# Patient Record
Sex: MALE | Race: OTHER | NOT HISPANIC OR LATINO | URBAN - METROPOLITAN AREA
[De-identification: names, ages, dates, MRNs, and addresses within clinical notes are randomized per-mention and may not be internally consistent; named-entity substitution may affect disease eponyms.]

---

## 2022-02-25 ENCOUNTER — EMERGENCY (EMERGENCY)
Facility: HOSPITAL | Age: 22
LOS: 1 days | Discharge: HOME | End: 2022-02-25
Attending: EMERGENCY MEDICINE
Payer: COMMERCIAL

## 2022-02-25 VITALS
TEMPERATURE: 98 F | DIASTOLIC BLOOD PRESSURE: 64 MMHG | WEIGHT: 173.06 LBS | OXYGEN SATURATION: 100 % | RESPIRATION RATE: 18 BRPM | SYSTOLIC BLOOD PRESSURE: 125 MMHG | HEART RATE: 74 BPM

## 2022-02-25 LAB
ALBUMIN SERPL ELPH-MCNC: 4.9 G/DL — SIGNIFICANT CHANGE UP (ref 3.3–5)
ALP SERPL-CCNC: 77 U/L — SIGNIFICANT CHANGE UP (ref 40–120)
ALT FLD-CCNC: 13 U/L — SIGNIFICANT CHANGE UP (ref 10–45)
ANION GAP SERPL CALC-SCNC: 10 MMOL/L — SIGNIFICANT CHANGE UP (ref 5–17)
AST SERPL-CCNC: 12 U/L — SIGNIFICANT CHANGE UP (ref 10–40)
BASOPHILS # BLD AUTO: 0.02 K/UL — SIGNIFICANT CHANGE UP (ref 0–0.2)
BASOPHILS NFR BLD AUTO: 0.2 % — SIGNIFICANT CHANGE UP (ref 0–2)
BILIRUB SERPL-MCNC: 0.4 MG/DL — SIGNIFICANT CHANGE UP (ref 0.2–1.2)
BUN SERPL-MCNC: 9 MG/DL — SIGNIFICANT CHANGE UP (ref 7–23)
CALCIUM SERPL-MCNC: 9.7 MG/DL — SIGNIFICANT CHANGE UP (ref 8.4–10.5)
CHLORIDE SERPL-SCNC: 103 MMOL/L — SIGNIFICANT CHANGE UP (ref 96–108)
CO2 SERPL-SCNC: 25 MMOL/L — SIGNIFICANT CHANGE UP (ref 22–31)
CREAT SERPL-MCNC: 0.87 MG/DL — SIGNIFICANT CHANGE UP (ref 0.5–1.3)
EOSINOPHIL # BLD AUTO: 0.01 K/UL — SIGNIFICANT CHANGE UP (ref 0–0.5)
EOSINOPHIL NFR BLD AUTO: 0.1 % — SIGNIFICANT CHANGE UP (ref 0–6)
GLUCOSE SERPL-MCNC: 101 MG/DL — HIGH (ref 70–99)
HCT VFR BLD CALC: 38.8 % — LOW (ref 39–50)
HGB BLD-MCNC: 12.6 G/DL — LOW (ref 13–17)
IMM GRANULOCYTES NFR BLD AUTO: 0.4 % — SIGNIFICANT CHANGE UP (ref 0–1.5)
LYMPHOCYTES # BLD AUTO: 1.26 K/UL — SIGNIFICANT CHANGE UP (ref 1–3.3)
LYMPHOCYTES # BLD AUTO: 12.2 % — LOW (ref 13–44)
MCHC RBC-ENTMCNC: 27.3 PG — SIGNIFICANT CHANGE UP (ref 27–34)
MCHC RBC-ENTMCNC: 32.5 GM/DL — SIGNIFICANT CHANGE UP (ref 32–36)
MCV RBC AUTO: 84.2 FL — SIGNIFICANT CHANGE UP (ref 80–100)
MONOCYTES # BLD AUTO: 0.43 K/UL — SIGNIFICANT CHANGE UP (ref 0–0.9)
MONOCYTES NFR BLD AUTO: 4.2 % — SIGNIFICANT CHANGE UP (ref 2–14)
NEUTROPHILS # BLD AUTO: 8.58 K/UL — HIGH (ref 1.8–7.4)
NEUTROPHILS NFR BLD AUTO: 82.9 % — HIGH (ref 43–77)
NRBC # BLD: 0 /100 WBCS — SIGNIFICANT CHANGE UP (ref 0–0)
PLATELET # BLD AUTO: 273 K/UL — SIGNIFICANT CHANGE UP (ref 150–400)
POTASSIUM SERPL-MCNC: 4.6 MMOL/L — SIGNIFICANT CHANGE UP (ref 3.5–5.3)
POTASSIUM SERPL-SCNC: 4.6 MMOL/L — SIGNIFICANT CHANGE UP (ref 3.5–5.3)
PROT SERPL-MCNC: 7.7 G/DL — SIGNIFICANT CHANGE UP (ref 6–8.3)
RBC # BLD: 4.61 M/UL — SIGNIFICANT CHANGE UP (ref 4.2–5.8)
RBC # FLD: 12.9 % — SIGNIFICANT CHANGE UP (ref 10.3–14.5)
SARS-COV-2 RNA SPEC QL NAA+PROBE: SIGNIFICANT CHANGE UP
SODIUM SERPL-SCNC: 138 MMOL/L — SIGNIFICANT CHANGE UP (ref 135–145)
WBC # BLD: 10.34 K/UL — SIGNIFICANT CHANGE UP (ref 3.8–10.5)
WBC # FLD AUTO: 10.34 K/UL — SIGNIFICANT CHANGE UP (ref 3.8–10.5)

## 2022-02-25 PROCEDURE — 99220: CPT

## 2022-02-25 RX ORDER — CEFAZOLIN SODIUM 1 G
1000 VIAL (EA) INJECTION ONCE
Refills: 0 | Status: COMPLETED | OUTPATIENT
Start: 2022-02-25 | End: 2022-02-25

## 2022-02-25 RX ORDER — KETOROLAC TROMETHAMINE 30 MG/ML
15 SYRINGE (ML) INJECTION EVERY 8 HOURS
Refills: 0 | Status: DISCONTINUED | OUTPATIENT
Start: 2022-02-25 | End: 2022-02-25

## 2022-02-25 RX ORDER — CEFAZOLIN SODIUM 1 G
1000 VIAL (EA) INJECTION EVERY 8 HOURS
Refills: 0 | Status: DISCONTINUED | OUTPATIENT
Start: 2022-02-25 | End: 2022-02-28

## 2022-02-25 RX ADMIN — Medication 100 MILLIGRAM(S): at 20:38

## 2022-02-25 RX ADMIN — Medication 100 MILLIGRAM(S): at 13:49

## 2022-02-25 RX ADMIN — Medication 100 MILLIGRAM(S): at 12:54

## 2022-02-25 NOTE — ED CDU PROVIDER INITIAL DAY NOTE - OBJECTIVE STATEMENT
22yM, no PMH, presenting with L. sided groin abscess. Reports it started a couple days ago as a pimple, now getting more painful/difficult to walk on L. leg due to pain. Started draining pus yesterday. No testicular pain/swelling, penile discharge. Not sexually active. No fevers, chills, chest pain, SOB, abd pain, n/v/d. no recent shaving at that area.

## 2022-02-25 NOTE — ED PROVIDER NOTE - ATTENDING CONTRIBUTION TO CARE
Attending MD Morrow: I personally have seen and examined this patient.  Resident note reviewed and agree on plan of care and except where noted.  See below for details.     seen in [facility], accompanied by father    22M with no reported PMH/PSH/Meds, no known drug allergies presents to the ED with L groin abscess.  Reports that first noted a few days ago an area of pain.  Reports yesterday noted spontaneous purulent drainage from same area with increased pain with ambulation.  Denies testicular pain, penile discharge, genital lesions, denies sexually active, history of STI.  Denies fevers, chills.  Denies chest pain, shortness of breath, palpitations. Denies abdominal pain, nausea, vomiting, diarrhea, bloody or black stools. Denies previous abscess, injury to area, denies shaving.  A ten (10) point review of systems was negative other than as stated in the HPI or elsewhere in the chart.     Exam:   General: NAD  HENT: head NCAT, airway patent with moist mucous membranes  Eyes: PERRL  Lungs: lungs CTAB with good inspiratory effort, no wheezing, no rhonchi, no rales  Cardiac: +S1S2, no m/r/g  GI: abdomen soft with +BS, NT, ND  : no CVAT, chaperoned by Dr. Ross, no genital lesions, no testicular tenderness, no scrotal edema, no blood or purulence at meatus  MSK: FROM at neck and extremities without gross deformities  Neuro: moving all extremities spontaneously, sensory grossly intact, no gross neuro deficits  Psych: normal mood and affect   Skin: 11cm x 3cm area of induration at crease of medial thigh (extension of inguinal crease posteriorly), 2cm x 1cm central area with spontaneous drainage of purulence, minimal amount expressed, no crepitus, no ecchymosis    A/P: 22M with L groin abscess, spontaneously draining no extension into perineum or scrotum, history and clinical picture not consistent with [facility] at this time, will obtain labs, give IV abx and place in CDU for obs, discussed with patient and father, amenable

## 2022-02-25 NOTE — ED ADULT NURSE NOTE - OBJECTIVE STATEMENT
Ambulatory, well-appearing patient in no acute distress complains of groin pain.  Pt reports 2 days of abscess formation to left groin, does not recall any cut or wound to area, no shaving, draining yellow/brown pus, presents today because of pain in left leg with ambulation.  Has not seen MD for this yet, no abx.  Pt is conversive, abdomen soft/non-distended/non-tender, denies abdominal pain, nausea, vomiting, diarrhea, decreased po intake, fevers, chills, urinary symptoms.  No hx of abscesses.  Has not tried anything for pain and declines pain medications here.

## 2022-02-25 NOTE — ED PROVIDER NOTE - CARE PLAN
1 Principal Discharge DX:	Cellulitis of skin   Principal Discharge DX:	Cellulitis and abscess  Goal:	Left, groin

## 2022-02-25 NOTE — ED CDU PROVIDER DISPOSITION NOTE - ATTENDING CONTRIBUTION TO CARE
Attending MD Morrow:   I personally have seen and examined this patient.  Physician assistant note reviewed and agree on plan of care and except where noted.  See below for details.     [facility]    22M with no reported PMH/PSH/Meds, no known drug allergies sent to the CDU after presenting to the ED with L groin abscess.  Reports first noted a few days ago, 2 days ago noted spontaneous purulent drainage with increased pain.  Reports pain improved from yesterday and feels area has improved.  Denies fevers, chills.  Denies chest pain, shortness of breath, abdominal pain, nausea, vomiting, diarrhea. Denies dysuria, hematuria, change in urinary habits or difficulty urinating.  A ten (10) point review of systems was negative other than as stated in the HPI or elsewhere in the chart.     Exam:   General: NAD  HENT: head NCAT, airway patent  Eyes: no conjunctival injection  Lungs: lungs CTAB with good inspiratory effort  Cardiac: +S1S2, no m/r/g  GI: abdomen soft with +BS, NT, ND  : chaperoned by IAN Gibbs, no scrotal induration, erythema or edema  MSK: FROM at neck and extremities without gross deformities  Neuro: moving all extremities spontaneously, sensory grossly intact, no gross neuro deficits  Psych: normal mood and affect   Skin: induration receded from demarcated area yesterday, today induration about 4cm x 2cm mostly anterior to the area of drainage, +spontaneously draining straw colored viscous purulence minimal amount able to be expressed, no crepitus     A/P: 22M with L groin abscess, spontaneously draining no extension into perineum or scrotum, history and clinical picture not consistent with [facility] at this time, improved from yesterday, will dc with Rx Keflex, Doxy.  Patient re-evaluated and feeling improved.  No acute issues at  this time.  Lab tests reviewed with patient.  Patient will stay for 12pm dose of Ancef.  If remains unchanged, stable for discharge after that dose.  Follow up instructions given, patient verbalized understanding of following up with PMD Dr. Pack on Monday 2/28/22, importance of follow up emphasized, return to ED parameters reviewed and patient verbalized understanding.  All questions answered, all concerns addressed.

## 2022-02-25 NOTE — ED PROVIDER NOTE - PROGRESS NOTE DETAILS
Area of induration marked with sterile marker. 11 cm x 3cm. Will be reassessed tmrw in CDU for improvement.

## 2022-02-25 NOTE — ED CDU PROVIDER DISPOSITION NOTE - PATIENT PORTAL LINK FT
You can access the FollowMyHealth Patient Portal offered by [facility] by registering at the following website: [email or web address]. By joining [employer]’s FollowMyHealth portal, you will also be able to view your health information using other applications (apps) compatible with our system.

## 2022-02-25 NOTE — ED CDU PROVIDER DISPOSITION NOTE - CLINICAL COURSE
22yM, no PMH, presenting with L. sided groin abscess. Reports it started a couple days ago as a pimple, now getting more painful/difficult to walk on L. leg due to pain. Started draining pus yesterday. No testicular pain/swelling, penile discharge. Not sexually active. No fevers, chills, chest pain, SOB, abd pain, n/v/d. no recent shaving at that area.  in ED, pt had lab work- no white count. afebrile. area examined by ED - draining on exam. pt sent to cdu for IV abx, pain control 22yM, no PMH, presenting with L. sided groin abscess. Reports it started a couple days ago as a pimple, now getting more painful/difficult to walk on L. leg due to pain. Started draining pus yesterday. No testicular pain/swelling, penile discharge. Not sexually active. No fevers, chills, chest pain, SOB, abd pain, n/v/d. no recent shaving at that area.  in ED, pt had lab work- no white count. afebrile. area examined by ED - draining on exam. pt sent to cdu for IV abx, pain control. While in CDU patient received multiple doses of IV ABX and pain control. Patient stable for discharge home with PO ABX and pcp f/u. Strict return precautions given. ED attending agrees with plan.

## 2022-02-25 NOTE — ED CDU PROVIDER INITIAL DAY NOTE - ATTENDING CONTRIBUTION TO CARE
Attending MD Mororw:   I personally have seen and examined this patient.  Physician assistant note reviewed and agree on plan of care and except where noted.  See below for details.     seen in [facility], accompanied by father    22M with no reported PMH/PSH/Meds, no known drug allergies presents to the ED with L groin abscess.  Reports that first noted a few days ago an area of pain.  Reports yesterday noted spontaneous purulent drainage from same area with increased pain with ambulation.  Denies testicular pain, penile discharge, genital lesions, denies sexually active, history of STI.  Denies fevers, chills.  Denies chest pain, shortness of breath, palpitations. Denies abdominal pain, nausea, vomiting, diarrhea, bloody or black stools. Denies previous abscess, injury to area, denies shaving.  A ten (10) point review of systems was negative other than as stated in the HPI or elsewhere in the chart.     Exam:   General: NAD  HENT: head NCAT, airway patent with moist mucous membranes  Eyes: PERRL  Lungs: lungs CTAB with good inspiratory effort, no wheezing, no rhonchi, no rales  Cardiac: +S1S2, no m/r/g  GI: abdomen soft with +BS, NT, ND  : no CVAT, chaperoned by Dr. Ross, no genital lesions, no testicular tenderness, no scrotal edema, no blood or purulence at meatus  MSK: FROM at neck and extremities without gross deformities  Neuro: moving all extremities spontaneously, sensory grossly intact, no gross neuro deficits  Psych: normal mood and affect   Skin: 11cm x 3cm area of induration at crease of medial thigh (extension of inguinal crease posteriorly), 2cm x 1cm central area with spontaneous drainage of purulence, minimal amount expressed, no crepitus, no ecchymosis    A/P: 22M with L groin abscess, spontaneously draining no extension into perineum or scrotum, history and clinical picture not consistent with [facility] at this time, will obtain labs, give IV abx and place in CDU for obs, discussed with patient and father, amenable

## 2022-02-25 NOTE — ED PROVIDER NOTE - PHYSICAL EXAMINATION
GENERAL: no acute distress, non-toxic appearing  HEENT: PERRLA, EOMI, normal conjunctiva, oral mucosa moist  CARDIAC: regular rate and rhythm  PULM: clear to ascultation bilaterally  GI: abdomen nondistended, soft, nontender  : no testicular pain or swelling, left posterior groin with indurated erythematous firm painful swelling with drainage site, purulent, no blood, no crepitus on exam  NEURO: alert and oriented x 3, normal speech, no gross neurologic deficit  MSK: no visible deformities  SKIN: no visible rashes, dry, well-perfused  PSYCH: appropriate mood and affect

## 2022-02-25 NOTE — ED CDU PROVIDER DISPOSITION NOTE - NSFOLLOWUPINSTRUCTIONS_ED_ALL_ED_FT
1. Stay hydrated. Elevate extremity with overlying infection.  2. Continue current home medications  3. Take Doxycycline 1 tab 2x/day for 9 days. Take Keflex 1 tab 4x/day for 9 days.  Start probiotic as instructed. avoid direct sunlight while on antibiotics, Doxycycline causes skin sensitivity to direct sunlight.   4. Follow up with your PCP in 1-2 days (Bring Printed results to your doctor visit)  5. Return if symptoms worsen, fever, weakness, spreading redness and all other concerns  ' 1. Stay hydrated. Elevate extremity with overlying infection. Keep area very clean with regular soap and water. Pat wound completely dry before covering. Change dressing daily. You may apply antibiotic ointment to area once daily.   2. Continue current home medications. Take Ibuprofen (i.e. Motrin, Advil) 600mg every 8 hrs for pain as needed. Take with food.   May alternate with Acetminophen (Tylenol) 650mg every 6 hours for pain as needed.    3. Take Doxycycline 1 tab 2x/day for 10 days. Take Keflex 1 tab 4x/day for 5 days.  Start probiotic as instructed. you may buy over the counter at drug store. avoid direct sunlight while on antibiotics, Doxycycline causes skin sensitivity to direct sunlight.   4. Follow up with your PCP in 1-2 days (Bring Printed results to your doctor visit)  5. Return if symptoms worsen, fever, weakness, spreading redness and all other concerns.

## 2022-02-25 NOTE — ED ADULT NURSE REASSESSMENT NOTE - NS ED NURSE REASSESS COMMENT FT1
accepted to CDU pending lab results, offered food/drink, provided with call bell/tv remote
Pt received from KONSTANTIN Mendez. Pt oriented to CDU & plan of care was discussed. Pt denies any pain to R thigh/ groin area at this time. Visualized area with IAN Perez at bedside. Area is tender, mild swelling noted. No drainage noted at this time, per pt it happens intermittently. Pt endorses discomfort with ambulation but states this has improved. Safety & comfort measures maintained. Call bell in reach. Will continue to monitor.

## 2022-02-25 NOTE — ED PROVIDER NOTE - OBJECTIVE STATEMENT
22yM, no PMH, presenting with L. sided groin abscess. Reports it started a couple days ago, now getting more painful/difficult to walk on L. leg and draining pus. No testicular pain/swelling, penile discharge. No fevers, chills, chest pain, SOB, abd pain, n/v/d. 22yM, no PMH, presenting with L. sided groin abscess. Reports it started a couple days ago, now getting more painful/difficult to walk on L. leg due to pain. Started draining pus yesterday. No testicular pain/swelling, penile discharge. Not sexually active. No fevers, chills, chest pain, SOB, abd pain, n/v/d.

## 2022-02-25 NOTE — ED ADULT NURSE NOTE - NSIMPLEMENTINTERV_GEN_ALL_ED
Implemented All Universal Safety Interventions:  [place] to call system. Call bell, personal items and telephone within reach. Instruct patient to call for assistance. Room bathroom lighting operational. Non-slip footwear when patient is off stretcher. Physically safe environment: no spills, clutter or unnecessary equipment. Stretcher in lowest position, wheels locked, appropriate side rails in place.

## 2022-02-25 NOTE — ED PROVIDER NOTE - CLINICAL SUMMARY MEDICAL DECISION MAKING FREE TEXT BOX
22yM, no PMH, presenting with L. sided groin abscess. No crepitus on exam, site of spontaneous purulent drainage, patient hemodynamically stable, afebrile. Do not suspect nec fasc at this time, however patient will require IV abx and observation to ensure no further progression of infection.

## 2022-02-26 VITALS
TEMPERATURE: 97 F | DIASTOLIC BLOOD PRESSURE: 70 MMHG | RESPIRATION RATE: 16 BRPM | SYSTOLIC BLOOD PRESSURE: 118 MMHG | HEART RATE: 73 BPM | OXYGEN SATURATION: 98 %

## 2022-02-26 PROCEDURE — 96375 TX/PRO/DX INJ NEW DRUG ADDON: CPT

## 2022-02-26 PROCEDURE — U0003: CPT

## 2022-02-26 PROCEDURE — 99284 EMERGENCY DEPT VISIT MOD MDM: CPT | Mod: 25

## 2022-02-26 PROCEDURE — 96376 TX/PRO/DX INJ SAME DRUG ADON: CPT

## 2022-02-26 PROCEDURE — 85025 COMPLETE CBC W/AUTO DIFF WBC: CPT

## 2022-02-26 PROCEDURE — 99217: CPT

## 2022-02-26 PROCEDURE — 80053 COMPREHEN METABOLIC PANEL: CPT

## 2022-02-26 PROCEDURE — U0005: CPT

## 2022-02-26 PROCEDURE — 87070 CULTURE OTHR SPECIMN AEROBIC: CPT

## 2022-02-26 PROCEDURE — 87077 CULTURE AEROBIC IDENTIFY: CPT

## 2022-02-26 PROCEDURE — 96374 THER/PROPH/DIAG INJ IV PUSH: CPT

## 2022-02-26 PROCEDURE — G0378: CPT

## 2022-02-26 RX ORDER — CEPHALEXIN 500 MG
1 CAPSULE ORAL
Qty: 20 | Refills: 0
Start: 2022-02-26 | End: 2022-03-02

## 2022-02-26 RX ADMIN — Medication 100 MILLIGRAM(S): at 13:21

## 2022-02-26 RX ADMIN — Medication 100 MILLIGRAM(S): at 01:37

## 2022-02-26 RX ADMIN — Medication 100 MILLIGRAM(S): at 04:11

## 2022-02-26 RX ADMIN — Medication 100 MILLIGRAM(S): at 12:02

## 2022-02-26 NOTE — ED CDU PROVIDER SUBSEQUENT DAY NOTE - PROGRESS NOTE DETAILS
Patient let with groin abscess spontaneously draining evaluated at bedside. NAD. VSS. States that his symptoms have improved since yesterday. Will keep for one more dose of IV ABX and will discharge today with Doxy and Keflex PO. Amber Gibbs PA-C Pt received IV keflex and doxy. Feels well. Denies any new complaints. Discussed with ED attending Dr. Morrow. Stable for d/c. Will f/u outpatient with PCP. Strict return precautions given. Amber Gibbs PA-C

## 2022-02-26 NOTE — ED CDU PROVIDER SUBSEQUENT DAY NOTE - ATTENDING CONTRIBUTION TO CARE
Attending MD Morrow:   I personally have seen and examined this patient.  Physician assistant note reviewed and agree on plan of care and except where noted.  See below for details.     [facility]    22M with no reported PMH/PSH/Meds, no known drug allergies sent to the CDU after presenting to the ED with L groin abscess.  Reports first noted a few days ago, 2 days ago noted spontaneous purulent drainage with increased pain.  Reports pain improved from yesterday and feels area has improved.  Denies fevers, chills.  Denies chest pain, shortness of breath, abdominal pain, nausea, vomiting, diarrhea. Denies dysuria, hematuria, change in urinary habits or difficulty urinating.  A ten (10) point review of systems was negative other than as stated in the HPI or elsewhere in the chart.     Exam:   General: NAD  HENT: head NCAT, airway patent  Eyes: no conjunctival injection  Lungs: lungs CTAB with good inspiratory effort  Cardiac: +S1S2, no m/r/g  GI: abdomen soft with +BS, NT, ND  : chaperoned by IAN Gibbs, no scrotal induration, erythema or edema  MSK: FROM at neck and extremities without gross deformities  Neuro: moving all extremities spontaneously, sensory grossly intact, no gross neuro deficits  Psych: normal mood and affect   Skin: induration receded from demarcated area yesterday, today induration about 4cm x 2cm mostly anterior to the area of drainage, +spontaneously draining straw colored viscous purulence minimal amount able to be expressed, no crepitus     A/P: 22M with L groin abscess, spontaneously draining no extension into perineum or scrotum, history and clinical picture not consistent with [place] at this time, improved from yesterday, will dc with Rx Keflex, Doxy.  Patient re-evaluated and feeling improved.  No acute issues at  this time.  Lab tests reviewed with patient.  Patient will stay for 12pm dose of Ancef.  If remains unchanged, stable for discharge after that dose.  Follow up instructions given, patient verbalized understanding of following up with PMD Dr. Pack on Monday 2/28/22, importance of follow up emphasized, return to ED parameters reviewed and patient verbalized understanding.  All questions answered, all concerns addressed.

## 2022-02-26 NOTE — ED CDU PROVIDER SUBSEQUENT DAY NOTE - PHYSICAL EXAMINATION
CONSTITUTIONAL: Well appearing and in no apparent distress.  ENT: Airway patent, moist mucous membranes.   EYES: Pupils equal, round and reactive to light. EOMI. Conjunctiva normal appearing.   CARDIAC: Normal rate, regular rhythm.  Heart sounds S1, S2.    RESPIRATORY: Breath sounds clear and equal bilaterally.   GASTROINTESTINAL: Abdomen soft, non-tender, not distended.  : L groin abscess near inner thigh, spontaneously draining straw colored discharge. No thick purulence noted. Minimal TTP and some surrounding erythema. No tracking or crepitus noted. Thigh soft to touch.   MUSCULOSKELETAL: Spine appears normal.  NEUROLOGICAL: Alert and oriented x3, no focal deficits, no motor or sensory deficits. 5/5 muscle strength throughout.  SKIN: Skin normal color, warm, dry and intact.   PSYCHIATRIC: Normal mood and affect.

## 2022-02-26 NOTE — ED CDU PROVIDER SUBSEQUENT DAY NOTE - HISTORY
Patient seen at bedside in NAD.  VSS.  Patient resting comfortably without complaints. L groin abscess spontaneously draining. Minimal surrounding erythema. No tracking noted. No crepitus. Pt states pain improving since onset. Able to ambulate better and with less pain. Will continue to monitor.

## 2022-03-01 LAB
CULTURE RESULTS: SIGNIFICANT CHANGE UP
SPECIMEN SOURCE: SIGNIFICANT CHANGE UP

## 2022-03-02 NOTE — ED POST DISCHARGE NOTE - DETAILS
3/2/22" Pt contacted, made aware of results as above. Feels much improved since discharge. Taking abx as prescribed and feels they are helping, no worsening sxs. No further ED management warranted at this time. All questions answered. Pt appreciative of call. - Mikhail Ashraf PA-C

## 2024-07-24 ENCOUNTER — EMERGENCY (EMERGENCY)
Facility: HOSPITAL | Age: 24
LOS: 1 days | Discharge: HOME | End: 2024-07-24
Attending: STUDENT IN AN ORGANIZED HEALTH CARE EDUCATION/TRAINING PROGRAM
Payer: COMMERCIAL

## 2024-07-24 VITALS
HEART RATE: 86 BPM | TEMPERATURE: 99 F | OXYGEN SATURATION: 99 % | RESPIRATION RATE: 18 BRPM | DIASTOLIC BLOOD PRESSURE: 80 MMHG | HEIGHT: 74 IN | WEIGHT: 164.91 LBS | SYSTOLIC BLOOD PRESSURE: 138 MMHG

## 2024-07-24 LAB
APPEARANCE UR: CLEAR — SIGNIFICANT CHANGE UP
BASOPHILS # BLD AUTO: 0.03 K/UL — SIGNIFICANT CHANGE UP (ref 0–0.2)
BASOPHILS NFR BLD AUTO: 0.4 % — SIGNIFICANT CHANGE UP (ref 0–2)
BILIRUB UR-MCNC: NEGATIVE — SIGNIFICANT CHANGE UP
COLOR SPEC: YELLOW — SIGNIFICANT CHANGE UP
DIFF PNL FLD: NEGATIVE — SIGNIFICANT CHANGE UP
EOSINOPHIL # BLD AUTO: 0.27 K/UL — SIGNIFICANT CHANGE UP (ref 0–0.5)
EOSINOPHIL NFR BLD AUTO: 3.6 % — SIGNIFICANT CHANGE UP (ref 0–6)
GLUCOSE UR QL: NEGATIVE MG/DL — SIGNIFICANT CHANGE UP
HCT VFR BLD CALC: 39.1 % — SIGNIFICANT CHANGE UP (ref 39–50)
HGB BLD-MCNC: 12.9 G/DL — LOW (ref 13–17)
IMM GRANULOCYTES NFR BLD AUTO: 0.1 % — SIGNIFICANT CHANGE UP (ref 0–0.9)
KETONES UR-MCNC: NEGATIVE MG/DL — SIGNIFICANT CHANGE UP
LEUKOCYTE ESTERASE UR-ACNC: NEGATIVE — SIGNIFICANT CHANGE UP
LYMPHOCYTES # BLD AUTO: 2.11 K/UL — SIGNIFICANT CHANGE UP (ref 1–3.3)
LYMPHOCYTES # BLD AUTO: 28.2 % — SIGNIFICANT CHANGE UP (ref 13–44)
MCHC RBC-ENTMCNC: 27.9 PG — SIGNIFICANT CHANGE UP (ref 27–34)
MCHC RBC-ENTMCNC: 33 GM/DL — SIGNIFICANT CHANGE UP (ref 32–36)
MCV RBC AUTO: 84.4 FL — SIGNIFICANT CHANGE UP (ref 80–100)
MONOCYTES # BLD AUTO: 0.58 K/UL — SIGNIFICANT CHANGE UP (ref 0–0.9)
MONOCYTES NFR BLD AUTO: 7.8 % — SIGNIFICANT CHANGE UP (ref 2–14)
NEUTROPHILS # BLD AUTO: 4.48 K/UL — SIGNIFICANT CHANGE UP (ref 1.8–7.4)
NEUTROPHILS NFR BLD AUTO: 59.9 % — SIGNIFICANT CHANGE UP (ref 43–77)
NITRITE UR-MCNC: NEGATIVE — SIGNIFICANT CHANGE UP
NRBC # BLD: 0 /100 WBCS — SIGNIFICANT CHANGE UP (ref 0–0)
NRBC BLD-RTO: 0 /100 WBCS — SIGNIFICANT CHANGE UP (ref 0–0)
PH UR: 6.5 — SIGNIFICANT CHANGE UP (ref 5–8)
PLATELET # BLD AUTO: 224 K/UL — SIGNIFICANT CHANGE UP (ref 150–400)
PROT UR-MCNC: NEGATIVE MG/DL — SIGNIFICANT CHANGE UP
RBC # BLD: 4.63 M/UL — SIGNIFICANT CHANGE UP (ref 4.2–5.8)
RBC # FLD: 12.3 % — SIGNIFICANT CHANGE UP (ref 10.3–14.5)
SP GR SPEC: 1.03 — SIGNIFICANT CHANGE UP (ref 1–1.03)
UROBILINOGEN FLD QL: 1 MG/DL — SIGNIFICANT CHANGE UP (ref 0.2–1)
WBC # BLD: 7.48 K/UL — SIGNIFICANT CHANGE UP (ref 3.8–10.5)
WBC # FLD AUTO: 7.48 K/UL — SIGNIFICANT CHANGE UP (ref 3.8–10.5)

## 2024-07-24 PROCEDURE — 82803 BLOOD GASES ANY COMBINATION: CPT

## 2024-07-24 PROCEDURE — 85025 COMPLETE CBC W/AUTO DIFF WBC: CPT

## 2024-07-24 PROCEDURE — 82947 ASSAY GLUCOSE BLOOD QUANT: CPT

## 2024-07-24 PROCEDURE — 80053 COMPREHEN METABOLIC PANEL: CPT

## 2024-07-24 PROCEDURE — 85014 HEMATOCRIT: CPT

## 2024-07-24 PROCEDURE — 86900 BLOOD TYPING SEROLOGIC ABO: CPT

## 2024-07-24 PROCEDURE — 85610 PROTHROMBIN TIME: CPT

## 2024-07-24 PROCEDURE — 85730 THROMBOPLASTIN TIME PARTIAL: CPT

## 2024-07-24 PROCEDURE — 84132 ASSAY OF SERUM POTASSIUM: CPT

## 2024-07-24 PROCEDURE — 86901 BLOOD TYPING SEROLOGIC RH(D): CPT

## 2024-07-24 PROCEDURE — 81003 URINALYSIS AUTO W/O SCOPE: CPT

## 2024-07-24 PROCEDURE — 99284 EMERGENCY DEPT VISIT MOD MDM: CPT | Mod: 25

## 2024-07-24 PROCEDURE — 96374 THER/PROPH/DIAG INJ IV PUSH: CPT | Mod: XU

## 2024-07-24 PROCEDURE — 99285 EMERGENCY DEPT VISIT HI MDM: CPT

## 2024-07-24 PROCEDURE — 82435 ASSAY OF BLOOD CHLORIDE: CPT

## 2024-07-24 PROCEDURE — 74177 CT ABD & PELVIS W/CONTRAST: CPT | Mod: MC

## 2024-07-24 PROCEDURE — 83605 ASSAY OF LACTIC ACID: CPT

## 2024-07-24 PROCEDURE — 87086 URINE CULTURE/COLONY COUNT: CPT

## 2024-07-24 PROCEDURE — 85018 HEMOGLOBIN: CPT

## 2024-07-24 PROCEDURE — 84295 ASSAY OF SERUM SODIUM: CPT

## 2024-07-24 PROCEDURE — 82330 ASSAY OF CALCIUM: CPT

## 2024-07-24 PROCEDURE — 86850 RBC ANTIBODY SCREEN: CPT

## 2024-07-24 RX ORDER — ACETAMINOPHEN 500 MG/5ML
1000 LIQUID (ML) ORAL ONCE
Refills: 0 | Status: COMPLETED | OUTPATIENT
Start: 2024-07-24 | End: 2024-07-24

## 2024-07-24 RX ADMIN — Medication 400 MILLIGRAM(S): at 23:55

## 2024-07-24 RX ADMIN — Medication 1000 MILLILITER(S): at 23:55

## 2024-07-25 VITALS
DIASTOLIC BLOOD PRESSURE: 70 MMHG | RESPIRATION RATE: 15 BRPM | OXYGEN SATURATION: 98 % | TEMPERATURE: 98 F | SYSTOLIC BLOOD PRESSURE: 125 MMHG | HEART RATE: 70 BPM

## 2024-07-25 PROBLEM — Z78.9 OTHER SPECIFIED HEALTH STATUS: Chronic | Status: ACTIVE | Noted: 2022-02-25

## 2024-07-25 LAB
ALBUMIN SERPL ELPH-MCNC: 4.7 G/DL — SIGNIFICANT CHANGE UP (ref 3.3–5)
ALP SERPL-CCNC: 102 U/L — SIGNIFICANT CHANGE UP (ref 40–120)
ALT FLD-CCNC: 15 U/L — SIGNIFICANT CHANGE UP (ref 10–45)
ANION GAP SERPL CALC-SCNC: 12 MMOL/L — SIGNIFICANT CHANGE UP (ref 5–17)
APTT BLD: 30.1 SEC — SIGNIFICANT CHANGE UP (ref 24.5–35.6)
AST SERPL-CCNC: 13 U/L — SIGNIFICANT CHANGE UP (ref 10–40)
BILIRUB SERPL-MCNC: 0.3 MG/DL — SIGNIFICANT CHANGE UP (ref 0.2–1.2)
BLD GP AB SCN SERPL QL: NEGATIVE — SIGNIFICANT CHANGE UP
BUN SERPL-MCNC: 18 MG/DL — SIGNIFICANT CHANGE UP (ref 7–23)
CALCIUM SERPL-MCNC: 9.5 MG/DL — SIGNIFICANT CHANGE UP (ref 8.4–10.5)
CHLORIDE SERPL-SCNC: 103 MMOL/L — SIGNIFICANT CHANGE UP (ref 96–108)
CO2 SERPL-SCNC: 24 MMOL/L — SIGNIFICANT CHANGE UP (ref 22–31)
CREAT SERPL-MCNC: 0.9 MG/DL — SIGNIFICANT CHANGE UP (ref 0.5–1.3)
EGFR: 123 ML/MIN/1.73M2 — SIGNIFICANT CHANGE UP
EGFR: 123 ML/MIN/1.73M2 — SIGNIFICANT CHANGE UP
GLUCOSE SERPL-MCNC: 96 MG/DL — SIGNIFICANT CHANGE UP (ref 70–99)
INR BLD: 1.24 RATIO — HIGH (ref 0.85–1.18)
POTASSIUM SERPL-MCNC: 4.1 MMOL/L — SIGNIFICANT CHANGE UP (ref 3.5–5.3)
POTASSIUM SERPL-SCNC: 4.1 MMOL/L — SIGNIFICANT CHANGE UP (ref 3.5–5.3)
PROT SERPL-MCNC: 7.6 G/DL — SIGNIFICANT CHANGE UP (ref 6–8.3)
PROTHROM AB SERPL-ACNC: 12.9 SEC — SIGNIFICANT CHANGE UP (ref 9.5–13)
RH IG SCN BLD-IMP: POSITIVE — SIGNIFICANT CHANGE UP
SODIUM SERPL-SCNC: 139 MMOL/L — SIGNIFICANT CHANGE UP (ref 135–145)

## 2024-07-25 PROCEDURE — 74177 CT ABD & PELVIS W/CONTRAST: CPT | Mod: 26,MC

## 2024-07-26 LAB
CULTURE RESULTS: SIGNIFICANT CHANGE UP
SPECIMEN SOURCE: SIGNIFICANT CHANGE UP